# Patient Record
(demographics unavailable — no encounter records)

---

## 2025-05-31 NOTE — HISTORY OF PRESENT ILLNESS
[N] : Patient does not use contraception [Y] : Positive pregnancy history [Menarche Age: ____] : age at menarche was [unfilled] [Menopause Age: ____] : age at menopause was [unfilled] [No] : Patient does not have concerns regarding sex [TextBox_4] : 71 year    (LMP ) presents for annual exam.   Feels well today, no complaints.   reports regular menses #reports vaginal dryness, causes discomfort during intercourse and in general   SexHx: Sexually active, 1 partner, relies on BTL. ObHx: C/S x3, ectopic x1,  x1 + PIH GynHx: Denie hx of ovarian cysts, hx of fibroids, hx of endometriosis, hx of STD's +HX OF ABNORMAL PAP with cryotherapy in  PMH:PCOS, PPD PSH: laparoscopic ovarian () and breast cyst removal. SocHx: Never smoker. Denies illicit drug use. Denies EtOH abuse. FamHx: no significant family history of GYN malignancy or disease +HX OF HTN + DM (mother)  -   -  -year old  # para # presents for an annual examination. Review of systems are negative. Review of systems are notable for notable for urinary retention and urinary urgency. She also reports having vaginal dryness.        [Mammogramdate] : 04/04/23 [TextBox_19] : BR-2 [PapSmeardate] : 07/29/24 [TextBox_31] : NEG [BoneDensityDate] : 4/25/24 [TextBox_37] : NORMAL [ColonoscopyDate] : 2019 [LMPDate] : 2010 [PGHxTotal] : 2 [[facility] : 2 [[facility] 2 [FreeTextEntry1] : 2010

## 2025-05-31 NOTE — DISCUSSION/SUMMARY
[FreeTextEntry1] : 72 y/o presents for a follow-up evaluation of UTI medication discussion. She says she usually gets UTI's during the summer after she goes in the pool. Pt reports dysuria and suprapubic pain, urinary retention and urinary urgency.   She usually gets medicated with 10d of Macrobid or methenamine (Dr. Dorado) with adequate response.  Here to day to discuss role for prophylactic treatment.   - patient previously prescribed Methenamine Hippurate, reports it worked well. Also discussed Macrobid ppx regimen, given she responds well to Macrobid for acute treatment.   - she desires to trial both  - rx sent for ppx treatment (1 dose before and after swimming) sent, as well as acute management for UTI if it is unsuccessful and she gets a UTI after swimming  - discussed after trialing both we can continue whichever works better  - discussed RTC precautions  She verbalized understanding and agreement with above counseling regarding differential diagnosis, evaluation, and plan. She was given time for questions/concerns which were all answered to her apparent satisfaction. All designated lab work for today drawn in office.  RTO PRN.

## 2025-05-31 NOTE — HISTORY OF PRESENT ILLNESS
[N] : Patient does not use contraception [Y] : Positive pregnancy history [Menarche Age: ____] : age at menarche was [unfilled] [Menopause Age: ____] : age at menopause was [unfilled] [No] : Patient does not have concerns regarding sex [TextBox_4] : 71 year    (LMP ) presents for annual exam.   Feels well today, no complaints.   reports regular menses #reports vaginal dryness, causes discomfort during intercourse and in general   SexHx: Sexually active, 1 partner, relies on BTL. ObHx: C/S x3, ectopic x1,  x1 + PIH GynHx: Denie hx of ovarian cysts, hx of fibroids, hx of endometriosis, hx of STD's +HX OF ABNORMAL PAP with cryotherapy in  PMH:PCOS, PPD PSH: laparoscopic ovarian () and breast cyst removal. SocHx: Never smoker. Denies illicit drug use. Denies EtOH abuse. FamHx: no significant family history of GYN malignancy or disease +HX OF HTN + DM (mother)  -   -  -year old  # para # presents for an annual examination. Review of systems are negative. Review of systems are notable for notable for urinary retention and urinary urgency. She also reports having vaginal dryness.        [Mammogramdate] : 04/04/23 [TextBox_19] : BR-2 [PapSmeardate] : 07/29/24 [TextBox_31] : NEG [BoneDensityDate] : 4/25/24 [TextBox_37] : NORMAL [ColonoscopyDate] : 2019 [PGHxTotal] : 2 [LMPDate] : 2010 [[facility]] : 2 [[facility] : 2 [FreeTextEntry1] : 2010

## 2025-05-31 NOTE — END OF VISIT
[FreeTextEntry3] : I, Tex Zazueta solely acted as scribe for Dr. Justin Hector on 05/28/2025 All medical entries made by the Scribe were at my, Dr. Hector, direction and personally dictated by me on 05/28/2025 . I have reviewed the chart and agree that the record accurately reflects my personal performance of the history, physical exam, assessment and plan. I have also personally directed, reviewed, and agreed with the chart. [Time Spent: ___ minutes] : I have spent [unfilled] minutes of time on the encounter which excludes teaching and separately reported services.